# Patient Record
Sex: FEMALE | Race: BLACK OR AFRICAN AMERICAN | URBAN - METROPOLITAN AREA
[De-identification: names, ages, dates, MRNs, and addresses within clinical notes are randomized per-mention and may not be internally consistent; named-entity substitution may affect disease eponyms.]

---

## 2017-06-26 NOTE — PATIENT DISCUSSION
(H25.13) Age-related nuclear cataract, bilateral - Assesment : Examination revealed cataract. - Plan : Monitor for changes. Advised patient to call our office with decreased vision or increased symptoms.  1 YEAR EXAM / MAC OCT

## 2017-06-26 NOTE — PATIENT DISCUSSION
(H35.372) Puckering of macula, left eye - Assesment : Examination revealed ERM. - Plan : OBSERVATION. CALL WITH DECREASED VISION.

## 2017-06-26 NOTE — PATIENT DISCUSSION
(N74.112) Vitreous degeneration, bilateral - Assesment : Examination revealed PVD - Plan : Monitor for changes. Advised patient to call our office with decreased vision or an increase in flashes and/or floaters.

## 2018-06-18 NOTE — PATIENT DISCUSSION
(H35.372) Puckering of macula, left eye - Assesment : Examination revealed ERM-UNCHANGED - Plan : OBSERVATION. CALL WITH DECREASED VISION.

## 2018-06-18 NOTE — PATIENT DISCUSSION
(H25.13) Age-related nuclear cataract, bilateral - Assesment : Examination revealed cataracts. OS>OD. CORTICAL SPOKING TO APPROACHING CENTER OS. CORTICAL SPOKING MID PERIPHERY OD. PATIENT WOULD LIKE TO HOLD OFF ON SX AT THIS TIME, CONSIDER IN 6 MONTHS - Plan : Monitor for changes. Advised patient to call our office with decreased vision or increased symptoms.  6 MONTHS/ DILATION / MAC OCT

## 2018-06-18 NOTE — PATIENT DISCUSSION
(Q89.300) Vitreous degeneration, bilateral - Assesment : Examination revealed PVD - Plan : Monitor for changes. Advised patient to call our office with decreased vision or an increase in flashes and/or floaters. Optical coherence tomography performed.

## 2019-02-01 NOTE — PATIENT DISCUSSION
(Z28.937) Vitreous degeneration, bilateral - Assesment : Examination revealed PVD OU. NO TEARS OR DETACHMENTS SEEN TODAY OU - Plan : Monitor for changes. Advised patient to call our office with decreased vision or an increase in flashes and/or floaters. Optical coherence tomography performed.   1 YEAR EXAM

## 2019-04-25 NOTE — PATIENT DISCUSSION
(H35.362) Drusen (degenerative) of macula, left eye - Assesment : Examination revealed Drusen- FEW FINE HARD - Plan : Monitor for changes. Advised patient to call our office with decreased vision or increased symptoms.

## 2019-04-25 NOTE — PATIENT DISCUSSION
(H33.302) Unspecified retinal break, left eye - Assesment : HORSESHOE TEAR- SUPERIOR TEMPORAL. RECOMMEND RETINA CONSULT TO FURTHER EVALUATE TEAR. - Plan : RETINA CONSULT TODAY - HORSESHOE TEAR OS *PT PLANNING ON CATARACT SURGERY IN THE NEAR FUTURE/ JUNE 3RD IF PATIENT RECEIVES TREATMENT OS, PLEASE SEND CLEARANCE PRIOR TO CATARACT SURGERY.

## 2019-04-25 NOTE — PATIENT DISCUSSION
"(H25.13) Age-related nuclear cataract, bilateral - Assesment : Examination revealed cataracts. OS>OD. DISCUSSED WITH PATIENT HE'S NEARSIGHTED. HE'S ABLE TO SEE THINGS UP CLOSE WITHOUT GLASSES. WHEN PT. INQUIRED ABOUT MFIOLS, INFORMED PT. THE FOCUS POINTS ARE DIVIDED. INFORMED PT HE WILL NOTICE HIS NEAR VISION IS NOT AS CLEAR COMPARED TO CURRENT VISION DUE TO THE LIGHT BEING DIVIDED TO OFFER DIFFERENT FOCUS POINTS WITH MFIOL. MFIOLS OFFER DISTANCE, INTERMEDIATE AND SOME READING. EXPECT TO WEAR OTC READING GLASSES FOR FINE PRINT OR LONG PERIODS OF READING  WITH MFIOLS. PATIENT STATED HE GOLFS AND READS FOR HOBBIES. DISCUSSED WITH PATIENT OS IS MORE NEARSIGHTED THAN OD WHICH WORKS TO HIS ADVANTAGE WITH INTERMEDIATE DISTANCE.  **RECOMMEND SCL OU AS A DIAGNOSTIC TOOL PRIOR TO CONSIDERING FOCUSING OU AT [address]. STRESSED TO PATIENT IT'S IMPORTANT TO REALIZE HOW DEPENDENT HE'LL BE ON READING GLASSES IF HE FOCUSES OU AT [address]). PATIENT MAY ALSO CONSIDER FOCUSING OU AT [address] [facility]. PATIENT DOES NOT WANT TO FOCUS OU AT NEAR. WANTS TO BE LESS DEPENDENT ON GLASSES FOR DISTANCE.   PATIENT STATED ""I DON'T MIND WEARING GLASSES FOR COMPUTER AND READING"" - Plan : Recommend package with advanced technology for the management and treatment of astigmatism and/or to aid in the management of presbyopia with blended vision

## 2019-04-25 NOTE — PATIENT DISCUSSION
(K00.519) Vitreous degeneration, bilateral - Assesment : Examination revealed PVD OU - Plan : Monitor for changes. Advised patient to call our office with decreased vision or an increase in flashes and/or floaters. Optical coherence tomography performed.

## 2019-06-11 NOTE — PATIENT DISCUSSION
"(H25.12) Age-related nuclear cataract, left eye - Assesment : Examination revealed cataracts. OS>OD. DISCUSSED WITH PATIENT HE'S NEARSIGHTED. HE'S ABLE TO SEE THINGS UP CLOSE WITHOUT GLASSES. WHEN PT. INQUIRED ABOUT MFIOLS, INFORMED PT. THE FOCUS POINTS ARE DIVIDED. INFORMED PT HE WILL NOTICE HIS NEAR VISION IS NOT AS CLEAR COMPARED TO CURRENT VISION DUE TO THE LIGHT BEING DIVIDED TO OFFER DIFFERENT FOCUS POINTS WITH MFIOL. MFIOLS OFFER DISTANCE, INTERMEDIATE AND SOME READING. EXPECT TO WEAR OTC READING GLASSES FOR FINE PRINT OR LONG PERIODS OF READING  WITH MFIOLS. PATIENT STATED HE GOLFS AND READS FOR HOBBIES. DISCUSSED WITH PATIENT OS IS MORE NEARSIGHTED THAN OD WHICH WORKS TO HIS ADVANTAGE WITH INTERMEDIATE DISTANCE.  **RECOMMEND SCL OU AS A DIAGNOSTIC TOOL PRIOR TO CONSIDERING FOCUSING OU AT [place]. STRESSED TO PATIENT IT'S IMPORTANT TO REALIZE HOW DEPENDENT HE'LL BE ON READING GLASSES IF HE FOCUSES OU AT [place]. PATIENT MAY ALSO CONSIDER FOCUSING OU AT [address] [place] [address]. PATIENT DOES NOT WANT TO FOCUS OU AT NEAR. WANTS TO BE LESS DEPENDENT ON GLASSES FOR DISTANCE.   PATIENT STATED ""I DON'T MIND WEARING GLASSES FOR COMPUTER AND READING""  6-11-19

## 2019-06-19 NOTE — PATIENT DISCUSSION
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 ORA done in OR during surgery  NORMAL POST OP APPEARANCE. DO NOT SUBMERGE HEAD UNDER WATER - Plan : Discussed signs and symptoms of infection and retinal detachments. Do not rub operated eye.  Follow drop schedule If redness,pain,decreased vision, flashes or floaters occur then contact clinic.  1 WEEK  THEN JULY 2 OR 3 FOR (FOLLOW UP TO CAT SX) REFRACT/MAC OCT

## 2019-06-25 NOTE — PATIENT DISCUSSION
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 - Plan : Signs and symptoms of infection and retinal detachment are outlined in your surgical packet. Do not rub operated eye. Follow drop schedule.  If redness, pain, decreased vision, flashes or floaters occur then contact clinic.  3 WEEKS REFRACT, MAC OCT

## 2019-07-03 NOTE — PATIENT DISCUSSION
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 Good Post Op results, No CME evident on MAC OCT today - Plan : Signs and symptoms of infection and retinal detachment are outlined in your surgical packet. Do not rub operated eye. Follow drop schedule. If redness, pain, decreased vision, flashes or floaters occur then contact clinic. Continue drop schedule OS, extend drops for 2 weeks after drops decrease to once daily OS. Recommend distance glasses for night driving.  Keep scheduled Exam and Mac OCT in 2/2020

## 2019-08-29 NOTE — PATIENT DISCUSSION
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 - Plan : Monitor for changes.

## 2019-08-29 NOTE — PATIENT DISCUSSION
(G88.054) Vitreous degeneration, bilateral - Assesment : Examination revealed PVD OU. No holes, breaks or tears 360 OD. - Plan : Monitor for changes. Advised patient to call our office with decreased vision or an increase in flashes and/or floaters.   Keep scheduled Exam and Mac OCT in 2/2020

## 2021-03-05 ENCOUNTER — IMPORTED ENCOUNTER (OUTPATIENT)
Dept: URBAN - METROPOLITAN AREA CLINIC 50 | Facility: CLINIC | Age: 51
End: 2021-03-05

## 2021-03-23 ENCOUNTER — IMPORTED ENCOUNTER (OUTPATIENT)
Dept: URBAN - METROPOLITAN AREA CLINIC 50 | Facility: CLINIC | Age: 51
End: 2021-03-23

## 2021-04-17 ASSESSMENT — TONOMETRY
OD_IOP_MMHG: 19
OS_IOP_MMHG: 19

## 2021-04-17 ASSESSMENT — VISUAL ACUITY
OS_CC: 20/40+
OD_CC: J1@ 16 IN
OS_CC: J1@ 16 IN
OS_PH: 20/30

## 2021-05-11 ENCOUNTER — PREPPED CHART (OUTPATIENT)
Dept: URBAN - METROPOLITAN AREA CLINIC 53 | Facility: CLINIC | Age: 51
End: 2021-05-11

## 2021-06-07 ENCOUNTER — EXCISION (OUTPATIENT)
Dept: URBAN - METROPOLITAN AREA CLINIC 49 | Facility: CLINIC | Age: 51
End: 2021-06-07

## 2021-06-07 VITALS — SYSTOLIC BLOOD PRESSURE: 121 MMHG | HEART RATE: 88 BPM | HEIGHT: 55 IN | DIASTOLIC BLOOD PRESSURE: 93 MMHG

## 2021-06-07 DIAGNOSIS — H02.824: ICD-10-CM

## 2021-06-07 PROCEDURE — 67840 REMOVE EYELID LESION: CPT

## 2021-06-07 ASSESSMENT — VISUAL ACUITY
OD_CC: CF 6FT
OS_CC: 20/30

## 2021-06-07 ASSESSMENT — TONOMETRY
OD_IOP_MMHG: 15
OS_IOP_MMHG: 14

## 2021-06-07 NOTE — PROCEDURE NOTE: CLINICAL
PROCEDURE NOTE: Excision of Eyelid Lesion Left Upper Lid. Diagnosis: Cysts of Eyelids. Anesthesia: 2% Lidocaine with Epi. Prep: Betadine Flush. Prior to treatment, the risks/benefits/alternatives were discussed. The patient wished to proceed with procedure. Local anesthetic was given. The lesion was incised and removed. The wound was cauterized to achieve hemostasis. Patient tolerated procedure well. There were no complications. Post procedure instructions given. Kaila Mcbride

## 2021-06-14 ENCOUNTER — 1 WEEK FOLLOW-UP (OUTPATIENT)
Dept: URBAN - METROPOLITAN AREA CLINIC 49 | Facility: CLINIC | Age: 51
End: 2021-06-14

## 2021-06-14 DIAGNOSIS — Z98.890: ICD-10-CM

## 2021-06-14 PROCEDURE — 99024 POSTOP FOLLOW-UP VISIT: CPT

## 2021-06-14 ASSESSMENT — VISUAL ACUITY
OS_CC: 20/30
OD_CC: CF 6FT

## 2021-07-19 NOTE — PATIENT DISCUSSION
(H35.372) Puckering of macula, left eye - Assesment : Examination revealed ERM. Stable - Plan : Monitor. Advised patient to call our office with decreased vision or increased symptoms. yes

## 2022-03-02 ENCOUNTER — COMPREHENSIVE EXAM (OUTPATIENT)
Dept: URBAN - METROPOLITAN AREA CLINIC 49 | Facility: CLINIC | Age: 52
End: 2022-03-02

## 2022-03-02 DIAGNOSIS — D23.122: ICD-10-CM

## 2022-03-02 PROCEDURE — 92285 EXTERNAL OCULAR PHOTOGRAPHY: CPT

## 2022-03-02 PROCEDURE — 92014 COMPRE OPH EXAM EST PT 1/>: CPT

## 2022-03-02 ASSESSMENT — TONOMETRY
OD_IOP_MMHG: 14
OS_IOP_MMHG: 14

## 2022-03-02 ASSESSMENT — VISUAL ACUITY
OS_PH: 20/25-
OD_CC: CF 4FT
OS_CC: 20/30-
OU_CC: J1-

## 2022-03-21 ENCOUNTER — CLINIC PROCEDURE ONLY (OUTPATIENT)
Dept: URBAN - METROPOLITAN AREA CLINIC 49 | Facility: CLINIC | Age: 52
End: 2022-03-21

## 2022-03-21 DIAGNOSIS — D23.122: ICD-10-CM

## 2022-03-21 PROCEDURE — 92285 EXTERNAL OCULAR PHOTOGRAPHY: CPT

## 2022-03-21 PROCEDURE — 67840 REMOVE EYELID LESION: CPT

## 2022-03-21 ASSESSMENT — VISUAL ACUITY
OS_CC: 20/40
OD_CC: CF 4FT

## 2022-03-21 ASSESSMENT — TONOMETRY
OS_IOP_MMHG: 16
OD_IOP_MMHG: 14

## 2022-03-21 NOTE — PROCEDURE NOTE: CLINICAL
PROCEDURE NOTE: Excision of Eyelid Lesion Left Lower Lid. Diagnosis: Other Benign Neoplasm of Skin of Eyelid, Including [place]. Anesthesia: 2% Lidocaine with Epi. Prep: Betadine Flush. Prior to treatment, the risks/benefits/alternatives were discussed. The patient wished to proceed with procedure. Local anesthetic was given. The lesion was incised and removed. The wound was cauterized to achieve hemostasis. Patient tolerated procedure well. There were no complications. Post procedure instructions given. Lakshmi Whiting

## 2022-04-04 ENCOUNTER — FOLLOW UP (OUTPATIENT)
Dept: URBAN - METROPOLITAN AREA CLINIC 49 | Facility: CLINIC | Age: 52
End: 2022-04-04

## 2022-04-04 DIAGNOSIS — Z98.890: ICD-10-CM

## 2022-04-04 PROCEDURE — 99024 POSTOP FOLLOW-UP VISIT: CPT

## 2022-04-04 ASSESSMENT — VISUAL ACUITY
OS_PH: 20/30-1
OS_CC: 20/40-1
OD_CC: CF 4FT

## 2025-04-21 ENCOUNTER — NEW PATIENT (OUTPATIENT)
Age: 55
End: 2025-04-21

## 2025-04-21 DIAGNOSIS — H53.021: ICD-10-CM

## 2025-04-21 DIAGNOSIS — H43.812: ICD-10-CM

## 2025-04-21 DIAGNOSIS — H25.813: ICD-10-CM

## 2025-04-21 PROCEDURE — 99204 OFFICE O/P NEW MOD 45 MIN: CPT

## 2025-04-21 PROCEDURE — 92015 DETERMINE REFRACTIVE STATE: CPT

## 2025-05-12 ENCOUNTER — PRE-OP/H&P (OUTPATIENT)
Age: 55
End: 2025-05-12

## 2025-05-12 DIAGNOSIS — H25.813: ICD-10-CM

## 2025-05-12 DIAGNOSIS — H18.603: ICD-10-CM

## 2025-05-12 DIAGNOSIS — H53.021: ICD-10-CM

## 2025-05-12 PROCEDURE — 92134 CPTRZ OPH DX IMG PST SGM RTA: CPT | Mod: NC

## 2025-05-12 PROCEDURE — PREOP PRE OP VISIT

## 2025-05-12 PROCEDURE — 92136 OPHTHALMIC BIOMETRY: CPT

## 2025-05-12 PROCEDURE — 92025-2 CORNEAL TOPOGRAPHY, PT: Mod: NC
